# Patient Record
(demographics unavailable — no encounter records)

---

## 2025-04-30 NOTE — CONSULT LETTER
[Dear  ___] : Dear  [unfilled], [Courtesy Letter:] : I had the pleasure of seeing your patient, [unfilled], in my office today. [Please see my note below.] : Please see my note below. [Consult Closing:] : Thank you very much for allowing me to participate in the care of this patient.  If you have any questions, please do not hesitate to contact me. [Sincerely,] : Sincerely, [FreeTextEntry3] : Jessica Reynolds MD Director, Pediatric Endocrinology [facility], [facility]  of Pediatrics  [employer] at [facility]

## 2025-04-30 NOTE — DATA REVIEWED
[FreeTextEntry1] : Growth records reviewed: Weight ~50th until 7y, 25th at 10y, then weight [id number] and further loss at 11-12y Height ~50th until 10y then plateau and no further growth since 10y  Outside records DEMOND stuart reviewed: 5/2023 Inpatient psychiatry admission/[facility] eating disorders unit for severe restricting type anorexia nervosa; initial hypokalemia and bradycardia; also anxiety, depression; on zyprexa and prozac inpatient: NGT feeds admission weight 5/12/23 24.6kg, discharge 6/21/23 30.2kg  Imaging 1/9/24 BA 12y @ CA 13y5m 5/1/24 Pit MRI - limited due to artifact from braces, nl size pit, midline infundib, 8mm hypoenhancement R pit, no mass effect  Labs 2/22/24 	 Stim test result: arginine/clonidine	0	30	60	90	120	120 	     Growth Hormone	Low	3.02	2.76	2.55	1.45	2.39	ng/ml  4/23/25 (Jose Piper) CMP nl Phos 4.6 TSH 0.512 low nl FT4 0.9 low nl T4 6.4 nl IGF1 pending but done wrong time

## 2025-04-30 NOTE — HISTORY OF PRESENT ILLNESS
[Premenarchal] : premenarchal [FreeTextEntry2] : Fredi is a 14y9m F for follow-up of short stature with severe growth failure and delayed puberty initially attributed solely to eating disorder.  Testing however consistent with growth hormone deficiency.  Subsequent to last visit started on growth hormone therapy.  Started June 2024. Not seen since 9/2024, on same dose since initiation. Getting weekly Tuesday night, missed during 1/2025 when insurance was switched.  Initially noted good growth, feels not as much in last few months.  No signs of puberty. No improved appetite. Notes increased shoe size (to 37), not sure outgrew clothes.  No longer headache.  Got eyeglasses 5/2024 Dr. Almeida, recent increase in rx.  Falling asleep very late 1am, wakes up 7am, wakes up a lot.  Had strep and did not take antibiotics (told mom to call PCP immediately in case can still get ceftriaxone)  Eating disorder initially RFID, then anorexia.  Was hospitalized for 4 weeks at [facility] in adolescent dept 10/2022, got her to gain 10lbs.  Full evaluation normal including bloodwork and bone age that was 10y.  Once went home again lost weight.  Readmitted another 4 weeks 3/2023.  Came home lost 10lbs in 2 weeks.  Went to [facility] eating disorder unit x 6 weeks 5-6/2023.   She get nervous about gaining weight.  Gains then loses.  Sleep poor, thoughts keep her up.  Top student, perfectionist, very artistic. New therapist.    Following with Dr. Lares, psychiatrist specializing in eating disorder.  Stopped pristiq (SNRI), nor is she taking other prescribed meds.  Appt tomorrow.  Gets a monthly shot (thinks abilify?).  Still not eating regular meals,  no B, not sure re lunch, D yes.   evening will only eat dairy meal, sometimes eggs, fish - doesn't like meat.   [TWNoteComboBox1] : short stature

## 2025-04-30 NOTE — ASSESSMENT
[FreeTextEntry1] : Repeat MRI once braces off  Follow-up BA at [facility]  Change injection to Wednesday night   Blood test before next visit Monday morning

## 2025-04-30 NOTE — DISCUSSION/SUMMARY
[FreeTextEntry1] : Fredi is a 13yo with eating disorder since 10yo resulting in significant weight loss/malnutrition as well as complete stagnation of linear growth and delayed puberty.  Has had recurrent inpatient admissions for eating disorder.  Despite this it was noted that BMI while below average is not extremely low, and absolute plateau of linear growth not typical even with eating disorder/malnutrition.  Extensive evaluation was consistent with growth hormone deficiency.  She started on growth hormone therapy with immediate excellent growth after years of no growth.  She however has not been seen for a prolonged period of time with decrease in growth velocity, likely having outgrown her dose. Monitoring labs unfortunately done at trough.  Reviewed again timing of labs.  To repeat at appropriate time in AM prior to next visit.  Bone age done today, to follow-up results.  Thyroid function remains suspicious for central hypothyroidism however T4 remains in normal range and no clear symptoms, to reassess with next lab along with cortisol.  IF persists will need to start on thyroid hormone, though compliance a concern. Starting puberty which is highly reassuring.

## 2025-04-30 NOTE — PHYSICAL EXAM
[Well Nourished] : well nourished [Interactive] : interactive [Looks Younger than Stated Years] : looks younger than stated years [WNL for age] : within normal limits of age [Normal S1 and S2] : normal S1 and S2 [Clear to Ausculation Bilaterally] : clear to auscultation bilaterally [Abdomen Soft] : soft [Abdomen Tenderness] : non-tender [Normal Appearance] : normal in appearance [Catarino Stage ___] : the Catarino stage for breast development was [unfilled] [Normal] : normal  [Dysmorphic] : non-dysmorphic [Goiter] : no goiter [Murmur] : no murmurs [Scoliosis] : scoliosis not appreciated [de-identified] : thin, very short, GV 4.8cm/4m, weight gain 2.7kg/7m, not wanting to talk, avoiding eye contact, Ht SD -2.5, somber [de-identified] : normal oropharynx, braces [FreeTextEntry1] : R T3, L T2-3 [de-identified] : normal patellar DTRs

## 2025-04-30 NOTE — PHYSICAL EXAM
[Well Nourished] : well nourished [Interactive] : interactive [Looks Younger than Stated Years] : looks younger than stated years [WNL for age] : within normal limits of age [Normal S1 and S2] : normal S1 and S2 [Clear to Ausculation Bilaterally] : clear to auscultation bilaterally [Abdomen Soft] : soft [Abdomen Tenderness] : non-tender [Normal Appearance] : normal in appearance [Catarino Stage ___] : the Catarino stage for breast development was [unfilled] [Normal] : normal  [Dysmorphic] : non-dysmorphic [Goiter] : no goiter [Murmur] : no murmurs [Scoliosis] : scoliosis not appreciated [de-identified] : thin, very short, GV 4.8cm/4m, weight gain 2.7kg/7m, not wanting to talk, avoiding eye contact, Ht SD -2.5, somber [de-identified] : normal oropharynx, braces [FreeTextEntry1] : R T3, L T2-3 [de-identified] : normal patellar DTRs

## 2025-04-30 NOTE — CONSULT LETTER
[Dear  ___] : Dear  [unfilled], [Courtesy Letter:] : I had the pleasure of seeing your patient, [unfilled], in my office today. [Please see my note below.] : Please see my note below. [Consult Closing:] : Thank you very much for allowing me to participate in the care of this patient.  If you have any questions, please do not hesitate to contact me. [Sincerely,] : Sincerely, [FreeTextEntry3] : Jessica Reynolds MD Director, Pediatric Endocrinology [facility], [facility]  of Pediatrics  [facility] [employer] at [facility]

## 2025-04-30 NOTE — REVIEW OF SYSTEMS
[Nl] : Neurological [Sleep Disturbances] : ~T sleep disturbances [Pubertal Concerns] : pubertal concerns [Short Stature] : short stature  [Emotional Problems] : ~T emotional problems [Change in Activity] : no change in activity [Wgt Loss (___ Lbs)] : no recent weight loss [Change in Vision] : no change in vision  [Diarrhea] : no diarrhea [Abdominal Pain] : no abdominal pain [Constipation] : no constipation [Headache] : no headache [FreeTextEntry1] : heartburn sometimes after eating

## 2025-04-30 NOTE — FAMILY HISTORY
[___ inches] : [unfilled] inches [FreeTextEntry5] : 16 [FreeTextEntry2] : 8 siblings healthy, girls menses at 12y

## 2025-04-30 NOTE — DISCUSSION/SUMMARY
[FreeTextEntry1] : Fredi is a 13yo with eating disorder since 8yo resulting in significant weight loss/malnutrition as well as complete stagnation of linear growth and delayed puberty.  Has had recurrent inpatient admissions for eating disorder.  Despite this it was noted that BMI while below average is not extremely low, and absolute plateau of linear growth not typical even with eating disorder/malnutrition.  Extensive evaluation was consistent with growth hormone deficiency.  She started on growth hormone therapy with immediate excellent growth after years of no growth.  She however has not been seen for a prolonged period of time with decrease in growth velocity, likely having outgrown her dose. Monitoring labs unfortunately done at trough.  Reviewed again timing of labs.  To repeat at appropriate time in AM prior to next visit.  Bone age done today, to follow-up results.  Thyroid function remains suspicious for central hypothyroidism however T4 remains in normal range and no clear symptoms, to reassess with next lab along with cortisol.  IF persists will need to start on thyroid hormone, though compliance a concern. Starting puberty which is highly reassuring.

## 2025-04-30 NOTE — DATA REVIEWED
[FreeTextEntry1] : Growth records reviewed: Weight ~50th until 7y, 25th at 10y, then weight [id number] and further loss at 11-12y Height ~50th until 10y then plateau and no further growth since 10y  Outside records DEMOND stuart reviewed: 5/2023 Inpatient psychiatry admission/[facility] eating disorders unit for severe restricting type anorexia nervosa; initial hypokalemia and bradycardia; also anxiety, depression; on zyprexa and prozac inpatient: NGT feeds admission weight 5/12/23 24.6kg, discharge 6/21/23 30.2kg  Imaging 1/9/24 BA 12y @ CA 13y5m 5/1/24 Pit MRI - limited due to artifact from braces, nl size pit, midline infundib, 8mm hypoenhancement R pit, no mass effect  Labs 2/22/24 	 Stim test result: arginine/clonidine	0	30	60	90	120	120 	     Growth Hormone	Low	3.02	2.76	2.55	1.45	2.39	ng/ml  4/23/25 (Jose Piper) CMP nl Phos 4.6 TSH 0.512 low nl FT4 0.9 low nl T4 6.4 nl IGF1 pending but done wrong time

## 2025-04-30 NOTE — HISTORY OF PRESENT ILLNESS
[Premenarchal] : premenarchal [FreeTextEntry2] : Fredi is a 14y9m F for follow-up of short stature with severe growth failure and delayed puberty initially attributed solely to eating disorder.  Testing however consistent with growth hormone deficiency.  Subsequent to last visit started on growth hormone therapy.  Started June 2024. Not seen since 9/2024, on same dose since initiation. Getting weekly Tuesday night, missed during 1/2025 when insurance was switched.  Initially noted good growth, feels not as much in last few months.  No signs of puberty. No improved appetite. Notes increased shoe size (to 37), not sure outgrew clothes.  No longer headache.  Got eyeglasses 5/2024 Dr. Almeida, recent increase in rx.  Falling asleep very late 1am, wakes up 7am, wakes up a lot.  Had strep and did not take antibiotics (told mom to call PCP immediately in case can still get ceftriaxone)  Eating disorder initially RFID, then anorexia.  Was hospitalized for 4 weeks at [facility] in adolescent dept 10/2022, got her to gain 10lbs.  Full evaluation normal including bloodwork and bone age that was 10y.  Once went home again lost weight.  Readmitted another 4 weeks 3/2023.  Came home lost 10lbs in 2 weeks.  Went to [facility] eating disorder unit x 6 weeks 5-6/2023.   She get nervous about gaining weight.  Gains then loses.  Sleep poor, thoughts keep her up.  Top student, perfectionist, very artistic. New therapist.    Following with Dr. Lares, psychiatrist specializing in eating disorder.  Stopped pristiq (SNRI), nor is she taking other prescribed meds.  Appt tomorrow.  Gets a monthly shot (thinks abilify?).  Still not eating regular meals,  no B, not sure re lunch, D yes.   evening will only eat dairy meal, sometimes eggs, fish - doesn't like meat.   [TWNoteComboBox1] : short stature

## 2025-07-24 NOTE — HISTORY OF PRESENT ILLNESS
[Premenarchal] : premenarchal [FreeTextEntry2] : Fredi is a 15y F for follow-up of short stature with severe growth failure and delayed puberty initially attributed solely to eating disorder.  Testing however consistent with growth hormone deficiency.  Subsequent to last visit started on growth hormone therapy.  Started June 2024. Last visit increased dose.  Getting weekly Wednesday night, no missed doses. Does not feel sees growth.  Notes has not increased shoe size since last visit (37), outgrew clothes. No signs of puberty.  No headache.  Got eyeglasses 5/2024 Dr. Almeida, uses in school only.  Falling asleep very late 1am, wakes up 7am, wakes up a lot.  No recent illness.  Eating disorder initially RFID, then anorexia.  Was hospitalized for 4 weeks at [facility] in adolescent dept 10/2022, got her to gain 10lbs.  Full evaluation normal including bloodwork and bone age that was 10y.  Once went home again lost weight.  Readmitted another 4 weeks 3/2023.  Came home lost 10lbs in 2 weeks.  Went to [facility] eating disorder unit x 6 weeks 5-6/2023.   She gets nervous about gaining weight.  Gains then loses.  Sleep poor, thoughts keep her up.  Top student, perfectionist, very artistic.  Currently in day camp.  Currently not seeing therapist, refuses.  Following with Dr. Liliane Lares, psychiatrist specializing in eating disorder.  Stopped pristiq (SNRI), nor is she taking other prescribed meds.  Gets a monthly shot (thinks abilify?).  Still not eating regular meals, no B, not sure re lunch, D yes.   evening will only eat dairy meal, sometimes eggs, fish - doesn't like meat.   [TWNoteComboBox1] : short stature

## 2025-07-24 NOTE — CONSULT LETTER
[Dear  ___] : Dear  [unfilled], [Courtesy Letter:] : I had the pleasure of seeing your patient, [unfilled], in my office today. [Please see my note below.] : Please see my note below. [Consult Closing:] : Thank you very much for allowing me to participate in the care of this patient.  If you have any questions, please do not hesitate to contact me. [Sincerely,] : Sincerely, [FreeTextEntry3] : Jessica Reynolds MD Director, Pediatric Endocrinology [facility], [facility]  of Pediatrics  [facility] [employer] at [facility]

## 2025-07-24 NOTE — CONSULT LETTER
[Dear  ___] : Dear  [unfilled], [Courtesy Letter:] : I had the pleasure of seeing your patient, [unfilled], in my office today. [Please see my note below.] : Please see my note below. [Consult Closing:] : Thank you very much for allowing me to participate in the care of this patient.  If you have any questions, please do not hesitate to contact me. [Sincerely,] : Sincerely, [FreeTextEntry3] : Jessica Reynolds MD Director, Pediatric Endocrinology [facility] [place], [facility]  of Pediatrics  [facility] [employer] at [facility]

## 2025-07-24 NOTE — DATA REVIEWED
[FreeTextEntry1] : Growth records reviewed: Weight ~50th until 7y, 25th at 10y, then weight [id number] and further loss at 11-12y Height ~50th until 10y then plateau and no further growth since 10y  Outside records DEMOND stuart reviewed: 5/2023 Inpatient psychiatry admission/[facility] eating disorders unit for severe restricting type anorexia nervosa; initial hypokalemia and bradycardia; also anxiety, depression; on zyprexa and prozac inpatient: NGT feeds admission weight 5/12/23 24.6kg, discharge 6/21/23 30.2kg  Imaging 1/9/24 BA 11y9m @ CA 13y5m 5/1/24 Pit MRI - limited due to artifact from braces, nl size pit, midline infundib, 8mm hypoenhancement R pit, no mass effect *4/30/25 BA 11y @ [id number]  Labs 2/22/24 	 Stim test result: arginine/clonidine	0	30	60	90	120	120 	     Growth Hormone	Low	3.02	2.76	2.55	1.45	2.39	ng/ml  4/23/25 (Jose Piper) CMP nl Phos 4.6 TSH 0.512 low nl FT4 0.9 low nl T4 6.4 nl IGF1 210 (low) but done wrong time

## 2025-07-24 NOTE — DISCUSSION/SUMMARY
[FreeTextEntry1] : Fredi is a 14yo with eating disorder since 8yo resulting in significant weight loss/malnutrition as well as complete stagnation of linear growth and delayed puberty.  Has had recurrent inpatient admissions for eating disorder.  Despite this it was noted that BMI while below average is not extremely low, and absolute plateau of linear growth not typical even with eating disorder/malnutrition.  Extensive evaluation was consistent with growth hormone deficiency.  She started on growth hormone therapy with immediate excellent growth after years of no growth.  Today growth suboptimal despite increase in dose last visit, however also lost some weight for the first time since on growth hormone which may have impacted.  Recent bone age remains delayed with open epiphyses.  Due for labs. Dose to be adjusted accordingly.  Discussed importance of adequate intake and weight gain to allow for growth. To follow-up with psychiatry.  Thyroid function has been suspicious for central hypothyroidism however T4 in normal range and no clear symptoms.  To assess at this time along with early morning cortisol.  If persists will start on thyroid hormone, though compliance a concern. Pubertal which is highly reassuring in regard to additional markers of pituitary function.

## 2025-07-24 NOTE — DATA REVIEWED
[FreeTextEntry1] : Growth records reviewed: Weight ~50th until 7y, 25th at 10y, then weight [id number] and further loss at 11-12y Height ~50th until 10y then plateau and no further growth since 10y  Outside records DEMOND stuart reviewed: 5/2023 Inpatient psychiatry admission/[facility] eating disorders unit for severe restricting type anorexia nervosa; initial hypokalemia and bradycardia; also anxiety, depression; on zyprexa and prozac inpatient: NGT feeds admission weight 5/12/23 24.6kg, discharge 6/21/23 30.2kg  Imaging 1/9/24 BA 11y9m @ CA 13y5m 5/1/24 Pit MRI - limited due to artifact from braces, nl size pit, midline infundib, 8mm hypoenhancement R pit, no mass effect *4/30/25 BA 11y @ [id number]  Labs 2/22/24 	 Stim test result: arginine/clonidine	0	30	60	90	120	120 	     Growth Hormone	Low	3.02	2.76	2.55	1.45	2.39	ng/ml  4/23/25 (Jose Piper) CMP nl Phos 4.6 TSH 0.512 low nl FT4 0.9 low nl T4 6.4 nl IGF1 210 (low) but done wrong time

## 2025-07-24 NOTE — REVIEW OF SYSTEMS
[Nl] : Neurological [Sleep Disturbances] : ~T sleep disturbances [Emotional Problems] : ~T emotional problems [Pubertal Concerns] : pubertal concerns [Short Stature] : short stature  [Change in Activity] : no change in activity [Wgt Loss (___ Lbs)] : no recent weight loss [Change in Vision] : no change in vision  [Diarrhea] : no diarrhea [Abdominal Pain] : no abdominal pain [Constipation] : no constipation [Headache] : no headache [FreeTextEntry1] : heartburn sometimes after eating

## 2025-07-24 NOTE — ASSESSMENT
[FreeTextEntry1] : Repeat MRI once braces off  Med Weds PM, labs Mon AM  Follow-up labs [facility] 7/21 and 7/23

## 2025-07-24 NOTE — HISTORY OF PRESENT ILLNESS
[Premenarchal] : premenarchal [FreeTextEntry2] : Fredi is a 15y F for follow-up of short stature with severe growth failure and delayed puberty initially attributed solely to eating disorder.  Testing however consistent with growth hormone deficiency.  Subsequent to last visit started on growth hormone therapy.  Started June 2024. Last visit increased dose.  Getting weekly Wednesday night, no missed doses. Does not feel sees growth.  Notes has not increased shoe size since last visit (37), outgrew clothes. No signs of puberty.  No headache.  Got eyeglasses 5/2024 Dr. Almeida, uses in school only.  Falling asleep very late 1am, wakes up 7am, wakes up a lot.  No recent illness.  Eating disorder initially RFID, then anorexia.  Was hospitalized for 4 weeks at [facility] in adolescent dept 10/2022, got her to gain 10lbs.  Full evaluation normal including bloodwork and bone age that was 10y.  Once went home again lost weight.  Readmitted another 4 weeks 3/2023.  Came home lost 10lbs in 2 weeks.  Went to [facility] eating disorder unit x 6 weeks 5-6/2023.   She gets nervous about gaining weight.  Gains then loses.  Sleep poor, thoughts keep her up.  Top student, perfectionist, very artistic.  Currently in day camp.  Currently not seeing therapist, refuses.  Following with Dr. Liliane Lares, psychiatrist specializing in eating disorder.  Stopped pristiq (SNRI), nor is she taking other prescribed meds.  Gets a monthly shot (thinks abilify?).  Still not eating regular meals, no B, not sure re lunch, D yes.   evening will only eat dairy meal, sometimes eggs, fish - doesn't like meat.   [TWNoteComboBox1] : short stature

## 2025-07-24 NOTE — PHYSICAL EXAM
[Well Nourished] : well nourished [Interactive] : interactive [Looks Younger than Stated Years] : looks younger than stated years [WNL for age] : within normal limits of age [Normal S1 and S2] : normal S1 and S2 [Clear to Ausculation Bilaterally] : clear to auscultation bilaterally [Abdomen Soft] : soft [Abdomen Tenderness] : non-tender [Normal Appearance] : normal in appearance [Catarino Stage ___] : the Catarino stage for breast development was [unfilled] [Normal] : normal  [FreeTextEntry1] : R T3, L T2-3 [Scoliosis] : scoliosis not appreciated [Dysmorphic] : non-dysmorphic [Goiter] : no goiter [Murmur] : no murmurs [de-identified] : thin, very short, Ht SD -2.35,GV 5.3cm/yr, weight loss 0.9kg/3m, not wanting to talk, avoiding eye contact, somber [de-identified] : normal oropharynx [de-identified] : normal patellar DTRs

## 2025-07-24 NOTE — DISCUSSION/SUMMARY
[FreeTextEntry1] : Fredi is a 16yo with eating disorder since 10yo resulting in significant weight loss/malnutrition as well as complete stagnation of linear growth and delayed puberty.  Has had recurrent inpatient admissions for eating disorder.  Despite this it was noted that BMI while below average is not extremely low, and absolute plateau of linear growth not typical even with eating disorder/malnutrition.  Extensive evaluation was consistent with growth hormone deficiency.  She started on growth hormone therapy with immediate excellent growth after years of no growth.  Today growth suboptimal despite increase in dose last visit, however also lost some weight for the first time since on growth hormone which may have impacted.  Recent bone age remains delayed with open epiphyses.  Due for labs. Dose to be adjusted accordingly.  Discussed importance of adequate intake and weight gain to allow for growth. To follow-up with psychiatry.  Thyroid function has been suspicious for central hypothyroidism however T4 in normal range and no clear symptoms.  To assess at this time along with early morning cortisol.  If persists will start on thyroid hormone, though compliance a concern. Pubertal which is highly reassuring in regard to additional markers of pituitary function.